# Patient Record
Sex: FEMALE | Race: WHITE
[De-identification: names, ages, dates, MRNs, and addresses within clinical notes are randomized per-mention and may not be internally consistent; named-entity substitution may affect disease eponyms.]

---

## 2019-10-14 ENCOUNTER — HOSPITAL ENCOUNTER (EMERGENCY)
Dept: HOSPITAL 74 - JER | Age: 84
LOS: 1 days | Discharge: ANOTHER HOSPITAL | End: 2019-10-15
Payer: COMMERCIAL

## 2019-10-14 VITALS — BODY MASS INDEX: 28.3 KG/M2

## 2019-10-14 VITALS — DIASTOLIC BLOOD PRESSURE: 100 MMHG | HEART RATE: 97 BPM | TEMPERATURE: 98 F | SYSTOLIC BLOOD PRESSURE: 176 MMHG

## 2019-10-14 DIAGNOSIS — Z87.891: ICD-10-CM

## 2019-10-14 DIAGNOSIS — H53.8: Primary | ICD-10-CM

## 2019-10-14 DIAGNOSIS — H54.3: ICD-10-CM

## 2019-10-14 DIAGNOSIS — I10: ICD-10-CM

## 2019-10-14 DIAGNOSIS — Z79.84: ICD-10-CM

## 2019-10-14 DIAGNOSIS — E03.9: ICD-10-CM

## 2019-10-14 DIAGNOSIS — H40.9: ICD-10-CM

## 2019-10-14 DIAGNOSIS — E78.5: ICD-10-CM

## 2019-10-14 DIAGNOSIS — E11.9: ICD-10-CM

## 2019-10-14 DIAGNOSIS — H26.9: ICD-10-CM

## 2019-10-14 LAB
ALBUMIN SERPL-MCNC: 3.5 G/DL (ref 3.4–5)
ALP SERPL-CCNC: 77 U/L (ref 45–117)
ALT SERPL-CCNC: 16 U/L (ref 13–61)
ANION GAP SERPL CALC-SCNC: 8 MMOL/L (ref 8–16)
APPEARANCE UR: CLEAR
APTT BLD: 36.1 SECONDS (ref 25.2–36.5)
AST SERPL-CCNC: 16 U/L (ref 15–37)
BACTERIA # UR AUTO: 53.5 /HPF
BASOPHILS # BLD: 0.6 % (ref 0–2)
BILIRUB SERPL-MCNC: 0.2 MG/DL (ref 0.2–1)
BILIRUB UR STRIP.AUTO-MCNC: NEGATIVE MG/DL
BUN SERPL-MCNC: 29 MG/DL (ref 7–18)
CALCIUM SERPL-MCNC: 9.3 MG/DL (ref 8.5–10.1)
CASTS URNS QL MICRO: 2 /LPF (ref 0–8)
CHLORIDE SERPL-SCNC: 100 MMOL/L (ref 98–107)
CO2 SERPL-SCNC: 30 MMOL/L (ref 21–32)
COLOR UR: YELLOW
CREAT SERPL-MCNC: 1.6 MG/DL (ref 0.55–1.3)
DEPRECATED RDW RBC AUTO: 15.1 % (ref 11.6–15.6)
EOSINOPHIL # BLD: 2 % (ref 0–4.5)
EPITH CASTS URNS QL MICRO: 4.3 /HPF
GLUCOSE SERPL-MCNC: 96 MG/DL (ref 74–106)
HCT VFR BLD CALC: 34.5 % (ref 32.4–45.2)
HGB BLD-MCNC: 11.4 GM/DL (ref 10.7–15.3)
INR BLD: 1.08 (ref 0.83–1.09)
KETONES UR QL STRIP: NEGATIVE
LEUKOCYTE ESTERASE UR QL STRIP.AUTO: NEGATIVE
LYMPHOCYTES # BLD: 27.8 % (ref 8–40)
MCH RBC QN AUTO: 30.2 PG (ref 25.7–33.7)
MCHC RBC AUTO-ENTMCNC: 33 G/DL (ref 32–36)
MCV RBC: 91.5 FL (ref 80–96)
MONOCYTES # BLD AUTO: 8.1 % (ref 3.8–10.2)
NEUTROPHILS # BLD: 61.5 % (ref 42.8–82.8)
NITRITE UR QL STRIP: NEGATIVE
PH UR: 6 [PH] (ref 5–8)
PLATELET # BLD AUTO: 383 K/MM3 (ref 134–434)
PMV BLD: 7.8 FL (ref 7.5–11.1)
POTASSIUM SERPLBLD-SCNC: 3.6 MMOL/L (ref 3.5–5.1)
PROT SERPL-MCNC: 7.6 G/DL (ref 6.4–8.2)
PROT UR QL STRIP: (no result)
PROT UR QL STRIP: NEGATIVE
PT PNL PPP: 12.8 SEC (ref 9.7–13)
RBC # BLD AUTO: 19 /HPF (ref 0–4)
RBC # BLD AUTO: 3.77 M/MM3 (ref 3.6–5.2)
SODIUM SERPL-SCNC: 139 MMOL/L (ref 136–145)
SP GR UR: 1.01 (ref 1.01–1.03)
UROBILINOGEN UR STRIP-MCNC: 0.2 MG/DL (ref 0.2–1)
WBC # BLD AUTO: 8 K/MM3 (ref 4–10)
WBC # UR AUTO: 3 /HPF (ref 0–5)

## 2019-10-14 PROCEDURE — 3E0337Z INTRODUCTION OF ELECTROLYTIC AND WATER BALANCE SUBSTANCE INTO PERIPHERAL VEIN, PERCUTANEOUS APPROACH: ICD-10-PCS

## 2019-10-14 NOTE — PDOC
Attending Attestation





- Resident


Resident Name: Liyah Carranza





- ED Attending Attestation


I have performed the following: I have examined & evaluated the patient, The 

case was reviewed & discussed with the resident, I agree w/resident's findings 

& plan





- HPI


HPI: 





10/14/19 21:58


see resident hpi





- Physicial Exam


PE: 





10/14/19 21:58


agree with resident exam





- Medical Decision Making





10/14/19 21:59


84-year-old female with intermittent vision loss to the right eye


Patient has some residual vision but states she is not back to baseline


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


CT scans of the brain and orbits show no significant acute abnormalities


Visualization of the retina on bedside exam is difficult due to lens opacity 

and patient compliance


Patient will be transferred to [facility] for ophthalmology 

evaluation


We do not have ophthalmology on call and patient will benefit from urgent 

consultation


This was discussed with the family prior to transfer


Patient agrees with transfer as well, she resides alone will be signing her own 

consent

## 2019-10-14 NOTE — PDOC
History of Present Illness





- General


Chief Complaint: Difficulty seeing


Stated Complaint: VISION PROBLEM


Time Seen by Provider: 10/14/19 18:18


History Source: Patient, Family


Exam Limitations: Dementia





- History of Present Illness


Initial Comments: 





10/14/19 20:08


84y F with PMH of HTN, HLD, NIDDM, Hypothyroidism, Glaucoma, L cataract and 

lens removal resulting in L eye blindness, urinary frequency presenting to ED 

with black out vision which has been resolving. Pt states she was going to the 

bathroom and could not see anything, describing the vision as black. This was 

sudden onset and lasted for a few hours but now the vision is returning but it 

is blurry. She is a poor historian and cannot describe how her vision is now 

and could not describe the initial event when her vision went black. Also 

states that she had "problems" with her vision for the past week. She denies 

eye pain, pain with movements, headache, numbness/tingling, weakness, chest pain

, sob, headache, fevers, chills. She lives alone at home and has a visiting 

nurse that comes in. 





PMD: Dexter


PMH: see hpi


PSH: hysterectomy


Meds: losartan, amlodipine, metformin, atorvastatin, see med rec


Allergies: nkda


Social: former smoker











Past History





- Past Medical History


Allergies/Adverse Reactions: 


 Allergies











Allergy/AdvReac Type Severity Reaction Status Date / Time


 


No Known Allergies Allergy   Verified 10/14/19 18:18











Home Medications: 


Ambulatory Orders





Amlodipine Besylate 10 mg PO DAILY 10/14/19 


Atorvastatin Calcium 10 mg PO DAILY 10/14/19 


Famotidine 20 mg PO DAILY 10/14/19 


Levothyroxine Sodium [Euthyrox] 100 mcg PO DAILY 10/14/19 


Losartan Potassium 50 mg PO DAILY 10/14/19 


Paroxetine HCl 30 mg PO DAILY 10/14/19 


metFORMIN HCL [Metformin HCl] 500 mg PO DAILY 10/14/19 








Cardiac Disorders: Yes


COPD: No


Diabetes: Yes (NIDM)


HTN: Yes


Hypercholesterolemia: Yes


Thyroid Disease: Yes (HYPO)





- Immunization History


Immunization Up to Date: Yes





- Psycho Social/Smoking Cessation Hx


Smoking History: Former smoker


Have you smoked in the past 12 months: No


If you are a former smoker, when did you quit?: 6YRS


Information on smoking cessation initiated: No


Hx Alcohol Use: No


Drug/Substance Use Hx: No





**Review of Systems





- Review of Systems


Constitutional: No: Chills, Fever


HEENTM: Yes: See HPI, Blurred Vision.  No: Ear Pain


Respiratory: No: Symptoms reported


Cardiac (ROS): No: Symptoms Reported


ABD/GI: No: Symptoms Reported


: No: Symptoms Reported


Musculoskeletal: No: Symptoms Reported


Integumentary: No: Symptoms Reported


Neurological: No: Symptoms reported





*Physical Exam





- Vital Signs


 Last Vital Signs











Temp Pulse Resp BP Pulse Ox


 


 98.2 F   87   17   104/71   100 


 


 10/14/19 18:19  10/14/19 18:19  10/14/19 18:19  10/14/19 18:19  10/14/19 18:19














- Physical Exam


General Appearance: Yes: Nourished, Appropriately Dressed.  No: Apparent 

Distress


HEENT: positive: EOMI, CHAPO (R eye direct reflex and consensual), Other (L eye 

hazy with injection, no pain with movements. ophthalamic exam limited in R eye 

due to cataract, retina appear to be hypermic. no temporal tenderness)


Neck: positive: Trachea midline, Supple


Respiratory/Chest: positive: Lungs Clear, Normal Breath Sounds.  negative: 

Crackles, Rales, Rhonchi, Stridor, Wheezing


Cardiovascular: positive: Regular Rhythm, Regular Rate, S1, S2.  negative: Edema

, JVD, Murmur


Vascular Pulses: Dorsalis-Pedis (R): 2+, Doralis-Pedis (L): 2+


Gastrointestinal/Abdominal: positive: Normal Bowel Sounds, Soft.  negative: 

Tender


Extremity: negative: Swelling, Calf Tenderness


Integumentary: positive: Normal Color, Dry, Warm


Neurologic: positive: CNs II-XII NML intact, Fully Oriented, Alert, Normal Mood/

Affect, Normal Response, Motor Strength 5/5





ED Treatment Course





- LABORATORY


CBC & Chemistry Diagram: 


 10/14/19 20:15





 10/14/19 20:15





- RADIOLOGY


Radiology Studies Ordered: 














 Category Date Time Status


 


 HEAD CT WITHOUT CONTRAST [CT] Stat CT Scan  10/14/19 20:04 Ordered


 


 CHEST X-RAY PORTABLE* [RAD] Stat Radiology  10/14/19 20:04 Ordered














Medical Decision Making





- Medical Decision Making





84y F presenting to ED with complaints of R vision loss x1 w? But states she 

noticed it at 12pm today. 


vitals: hypertensive





ddx includes but not limited to: CRAO, CRVO, GCA, AF, TIA/CVA, low suspicion 

for foreign body, glaucoma





labs including trop, coags, cbc, cmp, ua


ct head and orbits





CT head: no acute pathology


CT orbits: R lens calcification. 





Will need to be seen by ophthalmology. will call [facility]. 


Patient and family who were at bedside were made aware that pt may need to be 

transferred to a facility that has ophthalmology readily available. 





10/14/19 22:00


transfer acceptd by Dr. Jimenez (ophthalmology) at [facility] for ED-ED transfer for 

evaluation.


Pt agreed to transfer. 








Discharge





- Discharge Information


Problems reviewed: Yes


Clinical Impression/Diagnosis: 


 Visual disturbances





Condition: Stable


Disposition: TRANSFER ACUTE CARE/OTHER HOSP





- Admission


No





- Follow up/Referral


Referrals: 


Nito Jacobs [Primary Care Provider] - 





- Patient Discharge Instructions





- Post Discharge Activity

## 2019-10-15 NOTE — EKG
Test Reason : 

Blood Pressure : ***/*** mmHG

Vent. Rate : 096 BPM     Atrial Rate : 096 BPM

   P-R Int : 126 ms          QRS Dur : 074 ms

    QT Int : 380 ms       P-R-T Axes : 038 023 -01 degrees

   QTc Int : 480 ms

 

SINUS RHYTHM WITH PREMATURE ATRIAL COMPLEXES

OTHERWISE NORMAL ECG

NO PREVIOUS ECGS AVAILABLE

Confirmed by Tripp Boyer MD ([id number]) on 10/15/2019 12:51:40 PM

 

Referred By:             Confirmed By:Tripp Boyer MD

## 2022-02-26 NOTE — PDOC
FOCUS NOTE:     Per RN unable to place peripheral IV even with ultrasound. Pt only access site is Mahurkar which is to hopefully be removed pending GUY and thrombectomy procedure. Prior to procedures will place a midline in right arm.     Originally had planned tunneled PICC line placement however pt Mahurkar is on left side and thrombus is on right at junction between SVC and RA. Pt had stroke and is unable to move her left arm so not appropriate for midline. Spoke with Nephrologist Dr. Barreto, and due to limited access, she felt it was appropriate to place midline in right arm.     Samia Holland MD  Family Medicine Resident, PGY1     Rapid Medical Evaluation


Chief Complaint: Difficulty seeing


Time Seen by Provider: 10/14/19 18:18


Medical Evaluation: 


 Allergies











Allergy/AdvReac Type Severity Reaction Status Date / Time


 


No Known Allergies Allergy   Verified 10/14/19 18:18











10/14/19 18:19


I have performed a brief in-person evaluation of this patient.





The patient presents with a chief complaint of: 85 yo w/ DM, HTN, HLD, glaucoma

, hypothyroidism p/w progressive vision loss worsening over the past week.





The patient will proceed to the ED for further evaluation.





**Discharge Disposition





- Diagnosis


 Visual disturbances








- Referrals





- Patient Instructions





- Post Discharge Activity